# Patient Record
Sex: MALE | Race: WHITE | URBAN - METROPOLITAN AREA
[De-identification: names, ages, dates, MRNs, and addresses within clinical notes are randomized per-mention and may not be internally consistent; named-entity substitution may affect disease eponyms.]

---

## 2021-06-22 ENCOUNTER — DOCUMENTATION ONLY (OUTPATIENT)
Dept: CONSULT | Facility: CLINIC | Age: 36
End: 2021-06-22

## 2021-06-22 NOTE — PROGRESS NOTES
Manfred will be participating as a family member in exome sequencing for his son, Neri ([id number]).  Please refer to their chart for additional details.     Written informed consent was obtained on 6/22/2021 after reviewing the risks, benefits, and limitations of participating in exome sequencing as a family member    Manfred consented to receiving secondary findings for themself after reviewing information regarding them.    Yari Norton MS [facility]  Genetic Counselor  Email: [email or web address]  Phone: [phone]  Pager: [phone]